# Patient Record
Sex: FEMALE | HISPANIC OR LATINO
[De-identification: names, ages, dates, MRNs, and addresses within clinical notes are randomized per-mention and may not be internally consistent; named-entity substitution may affect disease eponyms.]

---

## 2018-05-02 PROBLEM — Z00.00 ENCOUNTER FOR PREVENTIVE HEALTH EXAMINATION: Status: ACTIVE | Noted: 2018-05-02

## 2018-05-23 ENCOUNTER — LABORATORY RESULT (OUTPATIENT)
Age: 63
End: 2018-05-23

## 2018-05-24 ENCOUNTER — APPOINTMENT (OUTPATIENT)
Dept: OBGYN | Facility: CLINIC | Age: 63
End: 2018-05-24
Payer: COMMERCIAL

## 2018-05-24 VITALS — WEIGHT: 157 LBS | SYSTOLIC BLOOD PRESSURE: 120 MMHG | DIASTOLIC BLOOD PRESSURE: 70 MMHG

## 2018-05-24 DIAGNOSIS — Z78.9 OTHER SPECIFIED HEALTH STATUS: ICD-10-CM

## 2018-05-24 PROCEDURE — 99387 INIT PM E/M NEW PAT 65+ YRS: CPT

## 2018-12-20 ENCOUNTER — APPOINTMENT (OUTPATIENT)
Dept: OBGYN | Facility: CLINIC | Age: 63
End: 2018-12-20

## 2018-12-21 ENCOUNTER — APPOINTMENT (OUTPATIENT)
Dept: OBGYN | Facility: CLINIC | Age: 63
End: 2018-12-21

## 2019-06-27 ENCOUNTER — APPOINTMENT (OUTPATIENT)
Dept: OBGYN | Facility: CLINIC | Age: 64
End: 2019-06-27

## 2019-08-08 ENCOUNTER — APPOINTMENT (OUTPATIENT)
Dept: OBGYN | Facility: CLINIC | Age: 64
End: 2019-08-08
Payer: COMMERCIAL

## 2019-08-08 VITALS — WEIGHT: 159 LBS | SYSTOLIC BLOOD PRESSURE: 120 MMHG | DIASTOLIC BLOOD PRESSURE: 70 MMHG

## 2019-08-08 DIAGNOSIS — Z85.3 PERSONAL HISTORY OF MALIGNANT NEOPLASM OF BREAST: ICD-10-CM

## 2019-08-08 PROCEDURE — 99213 OFFICE O/P EST LOW 20 MIN: CPT

## 2019-08-08 NOTE — COUNSELING
[Breast Self Exam] : breast self exam [Nutrition] : nutrition [Vitamins/Supplements] : vitamins/supplements [Exercise] : exercise [STD (testing, results, tx)] : STD (testing, results, tx) [Vaccines] : vaccines [Vulvar Hygiene] : vulvar hygiene [Weight Management] : weight management

## 2019-08-08 NOTE — PHYSICAL EXAM
[Awake] : awake [Alert] : alert [Soft] : soft [Oriented x3] : oriented to person, place, and time [Normal] : external genitalia [No Bleeding] : there was no active vaginal bleeding [Absent] : absent [Uterine Adnexae] : were not tender and not enlarged [RRR, No Murmurs] : RRR, no murmurs [CTAB] : CTAB [Acute Distress] : no acute distress [Thyroid Nodule] : no thyroid nodule [LAD] : no lymphadenopathy [Goiter] : no goiter [Mass] : no breast mass [Nipple Discharge] : no nipple discharge [Axillary LAD] : no axillary lymphadenopathy [Tender] : non tender [H/Smegaly] : no hepatosplenomegaly [Depressed Mood] : not depressed [Flat Affect] : affect not flat

## 2020-06-03 ENCOUNTER — LABORATORY RESULT (OUTPATIENT)
Age: 65
End: 2020-06-03

## 2020-06-04 ENCOUNTER — APPOINTMENT (OUTPATIENT)
Dept: OBGYN | Facility: CLINIC | Age: 65
End: 2020-06-04
Payer: COMMERCIAL

## 2020-06-04 VITALS
HEIGHT: 64 IN | BODY MASS INDEX: 27.66 KG/M2 | DIASTOLIC BLOOD PRESSURE: 68 MMHG | SYSTOLIC BLOOD PRESSURE: 126 MMHG | WEIGHT: 162 LBS

## 2020-06-04 PROCEDURE — 99213 OFFICE O/P EST LOW 20 MIN: CPT

## 2020-06-04 NOTE — PHYSICAL EXAM
[Awake] : awake [Alert] : alert [No Discharge] : no discharge [The Left Breast Was Examined] : a normal appearance [No Masses] : no breast masses were palpable [Soft] : soft [Oriented x3] : oriented to person, place, and time [Normal] : urethra [No Bleeding] : there was no active vaginal bleeding [Absent] : absent [RRR, No Murmurs] : RRR, no murmurs [Uterine Adnexae] : were not tender and not enlarged [CTAB] : CTAB [Acute Distress] : no acute distress [Mass] : no breast mass [Nipple Discharge] : no nipple discharge [Axillary LAD] : no axillary lymphadenopathy [Depressed Mood] : not depressed [Tender] : non tender [Flat Affect] : affect not flat

## 2020-07-22 ENCOUNTER — APPOINTMENT (OUTPATIENT)
Dept: OBGYN | Facility: CLINIC | Age: 65
End: 2020-07-22

## 2020-07-29 ENCOUNTER — APPOINTMENT (OUTPATIENT)
Dept: OBGYN | Facility: CLINIC | Age: 65
End: 2020-07-29
Payer: COMMERCIAL

## 2020-07-29 PROCEDURE — 51729 CYSTOMETROGRAM W/VP&UP: CPT

## 2020-07-29 PROCEDURE — 51784 ANAL/URINARY MUSCLE STUDY: CPT

## 2020-07-29 PROCEDURE — 51741 ELECTRO-UROFLOWMETRY FIRST: CPT

## 2020-08-20 ENCOUNTER — APPOINTMENT (OUTPATIENT)
Dept: OBGYN | Facility: CLINIC | Age: 65
End: 2020-08-20
Payer: COMMERCIAL

## 2020-08-20 ENCOUNTER — APPOINTMENT (OUTPATIENT)
Dept: OBGYN | Facility: CLINIC | Age: 65
End: 2020-08-20

## 2020-08-20 VITALS — SYSTOLIC BLOOD PRESSURE: 120 MMHG | WEIGHT: 158 LBS | DIASTOLIC BLOOD PRESSURE: 70 MMHG | BODY MASS INDEX: 27.12 KG/M2

## 2020-08-20 PROCEDURE — 99213 OFFICE O/P EST LOW 20 MIN: CPT

## 2020-09-04 NOTE — COUNSELING
[Exercise] : exercise [Breast Self Exam] : breast self exam [Nutrition] : nutrition [STD (testing, results, tx)] : STD (testing, results, tx) [Vitamins/Supplements] : vitamins/supplements [Bladder Hygiene] : bladder hygiene [Vulvar Hygiene] : vulvar hygiene

## 2020-10-15 ENCOUNTER — APPOINTMENT (OUTPATIENT)
Dept: OBGYN | Facility: CLINIC | Age: 65
End: 2020-10-15
Payer: COMMERCIAL

## 2020-10-15 VITALS
BODY MASS INDEX: 27.31 KG/M2 | HEIGHT: 64 IN | DIASTOLIC BLOOD PRESSURE: 70 MMHG | WEIGHT: 160 LBS | SYSTOLIC BLOOD PRESSURE: 114 MMHG

## 2020-10-15 PROCEDURE — 99213 OFFICE O/P EST LOW 20 MIN: CPT

## 2020-10-15 RX ORDER — DARIFENACIN HYDROBROMIDE 7.5 MG/1
7.5 TABLET, EXTENDED RELEASE ORAL
Qty: 1 | Refills: 6 | Status: DISCONTINUED | COMMUNITY
Start: 2020-08-20 | End: 2020-10-15

## 2020-10-15 RX ORDER — TOLTERODINE TARTRATE 4 MG/1
4 CAPSULE, EXTENDED RELEASE ORAL
Qty: 1 | Refills: 6 | Status: ACTIVE | COMMUNITY
Start: 2020-10-15 | End: 1900-01-01

## 2020-10-15 NOTE — HISTORY OF PRESENT ILLNESS
[TextBox_4] : pt comes for f/u feels good she has not had any results and would like to try another medication .

## 2022-03-31 ENCOUNTER — LABORATORY RESULT (OUTPATIENT)
Age: 67
End: 2022-03-31

## 2022-04-01 ENCOUNTER — APPOINTMENT (OUTPATIENT)
Dept: OBGYN | Facility: CLINIC | Age: 67
End: 2022-04-01
Payer: COMMERCIAL

## 2022-04-01 VITALS — BODY MASS INDEX: 27.46 KG/M2 | WEIGHT: 160 LBS | DIASTOLIC BLOOD PRESSURE: 74 MMHG | SYSTOLIC BLOOD PRESSURE: 132 MMHG

## 2022-04-01 DIAGNOSIS — R32 UNSPECIFIED URINARY INCONTINENCE: ICD-10-CM

## 2022-04-01 DIAGNOSIS — Z01.419 ENCOUNTER FOR GYNECOLOGICAL EXAMINATION (GENERAL) (ROUTINE) W/OUT ABNORMAL FINDINGS: ICD-10-CM

## 2022-04-01 PROCEDURE — 99397 PER PM REEVAL EST PAT 65+ YR: CPT

## 2022-04-01 NOTE — HISTORY OF PRESENT ILLNESS
[FreeTextEntry1] : pt feels good . Has not seen the breast specialist because the one she saw left and she does not know the one that’s there . I advised strongly to f/u and continue care as breast cancer can recur and may worsen prognosis .has not gone to the urologist also and didn't take medicastion .

## 2024-07-25 ENCOUNTER — APPOINTMENT (OUTPATIENT)
Dept: OBGYN | Facility: CLINIC | Age: 69
End: 2024-07-25